# Patient Record
Sex: FEMALE | Race: WHITE | NOT HISPANIC OR LATINO | Employment: FULL TIME | URBAN - NONMETROPOLITAN AREA
[De-identification: names, ages, dates, MRNs, and addresses within clinical notes are randomized per-mention and may not be internally consistent; named-entity substitution may affect disease eponyms.]

---

## 2017-12-27 ENCOUNTER — OFFICE VISIT (OUTPATIENT)
Dept: URGENT CARE | Facility: CLINIC | Age: 42
End: 2017-12-27
Payer: COMMERCIAL

## 2017-12-27 PROCEDURE — 99203 OFFICE O/P NEW LOW 30 MIN: CPT

## 2018-01-01 NOTE — PROGRESS NOTES
Assessment   1  Conjunctivitis, right eye (372 30) (H10 9)    Plan   Conjunctivitis, right eye    · Tobramycin 0 3 % Ophthalmic Solution; INSTILL 1 DROP INTO AFFECTED EYE(S)    4 TIMES DAILY    Discussion/Summary   Discussion Summary: To follow up with PCP in 3- 5 days if no improvement in her symptoms  Medication Side Effects Reviewed: Possible side effects of new medications were reviewed with the patient/guardian today  Understands and agrees with treatment plan: The treatment plan was reviewed with the patient/guardian  The patient/guardian understands and agrees with the treatment plan    Counseling Documentation With Imm: The patient was counseled regarding diagnostic results,-- instructions for management,-- prognosis,-- risks and benefits of treatment options  Follow Up Instructions: Follow Up with your Primary Care Provider in 3-5 days  If your symptoms worsen, go to the nearest [place] [id number] Emergency Department  Chief Complaint   1  Eye Discharge  Chief Complaint Free Text Note Form: Started yesterday with right eye discharge and redness  History of Present Illness   HPI: 37 y/o started yesterday with right eye discharge and redness  Hospital Based Practices Required Assessment:      Pain Assessment      the patient states they do not have pain  (on a scale of 0 to 10, the patient rates the pain at 0 )      Abuse And Domestic Violence Screen       Yes, the patient is safe at home  -- The patient states no one is hurting them  Depression And Suicide Screen  No, the patient has not had thoughts of hurting themself  No, the patient has not felt depressed in the past 7 days  Prefered Language is  [place]  Primary Language is  English  Eye Discharge: Zbigniew Toussaint presents with complaints of eye discharge        Associated symptoms include eye redness, but-- no eye itching,-- no visual blurring,-- no photophobia,-- no facial swelling,-- no facial pain,-- no fever-- and-- no nasal congestion  Review of Systems   Focused-Female:      Constitutional: No fever, no chills, feels well, no tiredness, no recent weight gain or loss  ENT: no ear ache, no loss of hearing, no nosebleeds or nasal discharge, no sore throat or hoarseness  Cardiovascular: no complaints of slow or fast heart rate, no chest pain, no palpitations, no leg claudication or lower extremity edema  Respiratory: no complaints of shortness of breath, no wheezing, no dyspnea on exertion, no orthopnea or PND  Breasts: no complaints of breast pain, breast lump or nipple discharge  Gastrointestinal: no complaints of abdominal pain, no constipation, no nausea or diarrhea, no vomiting, no bloody stools  Genitourinary: no complaints of dysuria, no incontinence, no pelvic pain, no dysmenorrhea, no vaginal discharge or abnormal vaginal bleeding  Musculoskeletal: no complaints of arthralgia, no myalgia, no joint swelling or stiffness, no limb pain or swelling  Integumentary: no complaints of skin rash or lesion, no itching or dry skin, no skin wounds  Neurological: no complaints of headache, no confusion, no numbness or tingling, no dizziness or fainting  Other Symptoms: right pink eye  ROS Reviewed:    ROS reviewed  Past Medical History   Active Problems And Past Medical History Reviewed: The active problems and past medical history were reviewed and updated today  Family History   Family History Reviewed: The family history was reviewed and updated today  Social History    · Never smoker   · No alcohol use   · No illicit drug use  Social History Reviewed: The social history was reviewed and updated today  Surgical History   Surgical History Reviewed: The surgical history was reviewed and updated today  Current Meds    1  Labetalol HCl TABS; Therapy: (Recorded:[id number]) to Recorded   2   MetFORMIN HCl - 1000 MG Oral Tablet; Therapy: (Recorded:[id number]) to Recorded   3  RaNITidine HCl - 150 MG Oral Capsule; Therapy: (Recorded:[id number]) to Recorded  Medication List Reviewed: The medication list was reviewed and updated today  Allergies   1  Biaxin    Vitals   Signs   Recorded: [id number] 09:09AM   Temperature: 97 9 F  Heart Rate: 74  Respiration: 18  Systolic: 678, RUE, Sitting  Diastolic: 68, RUE, Sitting  BP Comments: Dusty  O2 Saturation: 98    Physical Exam        Constitutional      General appearance: No acute distress, well appearing and well nourished  Eyes      Conjunctiva and lids: Abnormal   Conjunctiva Findings: right hyperemia-- and-- purulent discharge on the right, but-- no right subconjunctival hemorrhage,-- no hyperemia on the left,-- no purulent discharge on the left-- and-- no left subconjunctival hemorrhage  Eye Lids: right upper eyelid swelling, but-- no left upper eyelid swelling  Pupils and irises: Equal, round and reactive to light  Ears, Nose, Mouth, and Throat      External inspection of ears and nose: Normal        Otoscopic examination: Tympanic membranes translucent with normal light reflex  Canals patent without erythema  Nasal mucosa, septum, and turbinates: Normal without edema or erythema  Oropharynx: Normal with no erythema, edema, exudate or lesions  Pulmonary      Respiratory effort: No increased work of breathing or signs of respiratory distress  Auscultation of lungs: Clear to auscultation  Cardiovascular      Palpation of heart: Normal PMI, no thrills  Auscultation of heart: Normal rate and rhythm, normal S1 and S2, without murmurs  Lymphatic      Palpation of lymph nodes in neck: No lymphadenopathy  Musculoskeletal      Gait and station: Normal        Digits and nails: Normal without clubbing or cyanosis  Skin      Skin and subcutaneous tissue: Normal without rashes or lesions         Psychiatric      Orientation to person, place, and time: Normal        Mood and affect: Normal        Signatures    Electronically signed by : Zackary Amaya, [facility]; Dec 27 2017  9:30AM EST                       (Author)     Electronically signed by : REMY Baker ; Dec 31 2017 12:43PM EST                       (Co-author)

## 2018-01-23 VITALS
OXYGEN SATURATION: 98 % | RESPIRATION RATE: 18 BRPM | DIASTOLIC BLOOD PRESSURE: 68 MMHG | SYSTOLIC BLOOD PRESSURE: 124 MMHG | TEMPERATURE: 97.9 F | HEART RATE: 74 BPM

## 2018-07-16 ENCOUNTER — OFFICE VISIT (OUTPATIENT)
Dept: URGENT CARE | Facility: CLINIC | Age: 43
End: 2018-07-16
Payer: COMMERCIAL

## 2018-07-16 VITALS
SYSTOLIC BLOOD PRESSURE: 121 MMHG | HEART RATE: 80 BPM | WEIGHT: 207 LBS | OXYGEN SATURATION: 96 % | RESPIRATION RATE: 18 BRPM | BODY MASS INDEX: 34.49 KG/M2 | DIASTOLIC BLOOD PRESSURE: 88 MMHG | TEMPERATURE: 99 F | HEIGHT: 65 IN

## 2018-07-16 DIAGNOSIS — H10.33 ACUTE BACTERIAL CONJUNCTIVITIS OF BOTH EYES: Primary | ICD-10-CM

## 2018-07-16 PROCEDURE — 99213 OFFICE O/P EST LOW 20 MIN: CPT | Performed by: NURSE PRACTITIONER

## 2018-07-16 RX ORDER — BLOOD-GLUCOSE METER
KIT MISCELLANEOUS
COMMUNITY
Start: 2006-03-14

## 2018-07-16 RX ORDER — METFORMIN HYDROCHLORIDE 1000 MG/1
1000 TABLET, FILM COATED, EXTENDED RELEASE ORAL
COMMUNITY
Start: 2016-06-14

## 2018-07-16 RX ORDER — ERGOCALCIFEROL 1.25 MG/1
50000 CAPSULE ORAL
COMMUNITY

## 2018-07-16 RX ORDER — ICOSAPENT ETHYL 1000 MG/1
2 CAPSULE ORAL
COMMUNITY

## 2018-07-16 RX ORDER — CABERGOLINE 0.5 MG/1
TABLET ORAL
COMMUNITY
Start: 2018-03-23

## 2018-07-16 RX ORDER — LABETALOL 200 MG/1
TABLET, FILM COATED ORAL
COMMUNITY
Start: 2018-06-13

## 2018-07-16 RX ORDER — ACETAMINOPHEN, ASPIRIN AND CAFFEINE 250; 250; 65 MG/1; MG/1; MG/1
2 TABLET, FILM COATED ORAL
COMMUNITY

## 2018-07-16 RX ORDER — IBUPROFEN 800 MG/1
TABLET ORAL
COMMUNITY
Start: 2014-04-04

## 2018-07-16 RX ORDER — TOBRAMYCIN AND DEXAMETHASONE 3; 1 MG/ML; MG/ML
1 SUSPENSION/ DROPS OPHTHALMIC
Qty: 5 ML | Refills: 0 | Status: SHIPPED | OUTPATIENT
Start: 2018-07-16 | End: 2018-07-23

## 2018-07-16 RX ORDER — BROMOCRIPTINE MESYLATE 2.5 MG/1
2.5 TABLET ORAL
COMMUNITY

## 2018-07-16 RX ORDER — SUMATRIPTAN 100 MG/1
TABLET, FILM COATED ORAL
COMMUNITY
Start: 2011-08-23

## 2018-07-16 RX ORDER — LANCETS
EACH MISCELLANEOUS
COMMUNITY
Start: 2013-04-03

## 2018-07-16 NOTE — PROGRESS NOTES
[facility]        NAME: Ricardo Cesar is a 39 y o  female  : 1975    MRN: [id number]  DATE: 2018  TIME: 11:35 AM    Assessment and Plan   Acute bacterial conjunctivitis of both eyes [H10 33]  1  Acute bacterial conjunctivitis of both eyes  tobramycin-dexamethasone (TOBRADEX) ophthalmic suspension         Patient Instructions       Follow up with PCP in 3-5 days  Proceed to  ER if symptoms worsen  Chief Complaint     Chief Complaint   Patient presents with    Eye Pain     L eye redness with itching and drainage noted   Ezekiel Carmona LPN         History of Present Illness       21-year-old female presents to urgent care with chief complaint of left eye redness itching drainage x1 day, patient also states her right eye is starting to have some redness no drainage noted from that eye  She has not used any over-the-counter medications for the symptoms reports symptoms gradually worsening  Eye Pain    Both eyes are affected  This is a new problem  The current episode started today  The problem occurs constantly  The problem has been gradually worsening  There was no injury mechanism  The patient is experiencing no pain  There is no known exposure to pink eye  She wears contacts  Associated symptoms include an eye discharge and itching  Pertinent negatives include no blurred vision, double vision, eye redness, fever, foreign body sensation, nausea, photophobia, recent URI or vomiting  She has tried nothing for the symptoms  The treatment provided no relief  Review of Systems   Review of Systems   Constitutional: Negative  Negative for fever  HENT: Negative  Eyes: Positive for pain, discharge and itching  Negative for blurred vision, double vision, photophobia, redness and visual disturbance  Respiratory: Negative  Cardiovascular: Negative  Gastrointestinal: Negative  Negative for nausea and vomiting  Endocrine: Negative  Genitourinary: Negative      Musculoskeletal: Negative  Skin: Negative  Allergic/Immunologic: Negative  Neurological: Negative  Hematological: Negative  Psychiatric/Behavioral: Negative  All other systems reviewed and are negative          Current Medications       Current Outpatient Prescriptions:     aspirin-acetaminophen-caffeine (EXCEDRIN MIGRAINE) 250-250-65 MG per tablet, Take 2 tablets by mouth, Disp: , Rfl:     Blood Glucose Monitoring Suppl (ACURA BLOOD GLUCOSE METER) w/Device KIT, by Does not apply route, Disp: , Rfl:     bromocriptine (PARLODEL) 2 5 mg tablet, Take 2 5 mg by mouth, Disp: , Rfl:     cabergoline (DOSTINEX) 0 5 MG tablet, TAKE ONE-HALF (1/2) TABLET AT BEDTIME ONCE EVERY 10 DAYS, Disp: , Rfl:     ergocalciferol (VITAMIN D2) 50,000 units, Take 50,000 Units by mouth, Disp: , Rfl:     glucose blood (ONE TOUCH ULTRA TEST) test strip, check bs daily, Disp: , Rfl:     glucose monitoring kit (FREESTYLE) monitoring kit, by Does not apply route, Disp: , Rfl:     ibuprofen (MOTRIN) 800 mg tablet, Take by mouth, Disp: , Rfl:     Icosapent Ethyl (VASCEPA) 1 g CAPS, Take 2 g by mouth, Disp: , Rfl:     insulin regular (HumuLIN R,NovoLIN R) 100 units/mL injection, Inject 14 units under the skin as directed per specialty, Disp: , Rfl:     labetalol (NORMODYNE) 200 mg tablet, TAKE 1 TABLET TWICE A DAY, Disp: , Rfl:     Lancets Thin MISC, by Does not apply route, Disp: , Rfl:     metFORMIN (GLUMETZA) 1000 MG (MOD) 24 hr tablet, Take 1,000 mg by mouth, Disp: , Rfl:     SUMAtriptan (IMITREX) 100 mg tablet, Take by mouth, Disp: , Rfl:     tobramycin-dexamethasone (TOBRADEX) ophthalmic suspension, Administer 1 drop to both eyes every 4 (four) hours while awake for 7 days, Disp: 5 mL, Rfl: 0    Current Allergies     Allergies as of 07/16/2018 - Reviewed 07/16/2018   Allergen Reaction Noted    Clarithromycin  09/12/2016            The following portions of the patient's history were reviewed and updated as appropriate: allergies, current medications, past family history, past medical history, past social history, past surgical history and problem list      Past Medical History:   Diagnosis Date    Diabetes mellitus ([address] )        Past Surgical History:   Procedure Laterality Date    UTERINE FIBROID SURGERY         Family History   Problem Relation Age of Onset    Cancer Mother     Heart disease Father          Medications have been verified  Objective   /88 (BP Location: Right arm, Patient Position: Sitting, Cuff Size: Standard)   Pulse 80   Temp 99 °F (37 2 °C) (Tympanic)   Resp 18   Ht 5' 5" (1 651 m)   Wt 93 9 kg (207 lb)   LMP 07/04/2018 (Exact Date)   SpO2 96%   BMI 34 45 kg/m²        Physical Exam     Physical Exam   Constitutional: She is oriented to person, place, and time  Vital signs are normal  She appears well-developed  HENT:   Head: Normocephalic and atraumatic  Right Ear: External ear normal    Left Ear: External ear normal    Nose: Nose normal    Mouth/Throat: Oropharynx is clear and moist    Eyes: EOM are normal  Pupils are equal, round, and reactive to light  Lids are everted and swept, no foreign bodies found  Right eye exhibits no chemosis, no discharge, no exudate and no hordeolum  No foreign body present in the right eye  Left eye exhibits exudate  Left eye exhibits no chemosis, no discharge and no hordeolum  No foreign body present in the left eye  Right conjunctiva is injected  Right conjunctiva has no hemorrhage  Left conjunctiva is injected  Left conjunctiva has no hemorrhage  No scleral icterus  Neck: Normal range of motion  Neck supple  Cardiovascular: Normal rate, regular rhythm, normal heart sounds and intact distal pulses  Pulmonary/Chest: Effort normal and breath sounds normal    Abdominal: Soft  Normal appearance and bowel sounds are normal    Musculoskeletal: Normal range of motion  Neurological: She is alert and oriented to person, place, and time   She has normal reflexes  Skin: Skin is warm, dry and intact  Psychiatric: She has a normal mood and affect  Her speech is normal and behavior is normal  Judgment and thought content normal    Nursing note and vitals reviewed

## 2018-07-16 NOTE — PATIENT INSTRUCTIONS
Conjunctivitis   AMBULATORY CARE:   Conjunctivitis,  or pink eye, is inflammation of your conjunctiva  The conjunctiva is a thin tissue that covers the front of your eye and the back of your eyelids  The conjunctiva helps protect your eye and keep it moist  Conjunctivitis may be caused by bacteria, allergies, or a virus  If your conjunctivitis is caused by bacteria, it may get better on its own in about 7 days  Viral conjunctivitis can last up to 3 weeks  Common symptoms may include any of the following: You will usually have symptoms in both eyes if your conjunctivitis is caused by allergies  You may also have other allergic symptoms, such as a rash or runny nose  Symptoms will usually start in 1 eye if your conjunctivitis is caused by a virus or bacteria  · Redness in the whites of your eye    · Itching in your eye or around your eye    · Feeling like there is something in your eye    · Watery or thick, sticky discharge    · Crusty eyelids when you wake up in the morning    · Burning, stinging, or swelling in your eye    · Pain when you see bright light  Seek care immediately if:   · You have worsening eye pain  · The swelling in your eye gets worse, even after treatment  · Your vision suddenly becomes worse or you cannot see at all  Contact your healthcare provider if:   · You develop a fever and ear pain  · You have tiny bumps or spots of blood on your eye  · You have questions or concerns about your condition or care  Treatment  will depend on the cause of your conjunctivitis  You may need antibiotics or allergy medicine as a pill, eye drop, or eye ointment  Manage your symptoms:   · Apply a cool compress  Wet a washcloth with cold water and place it on your eye  This will help decrease itching and irritation  · Do not wear contact lenses  They can irritate your eye  Throw away the pair you are using and ask when you can wear them again   Use a new pair of lenses when your healthcare provider says it is okay  · Avoid irritants  Stay away from smoke filled areas  Shield your eyes from wind and sun  · Flush your eye  You may need to flush your eye with saline to help decrease your symptoms  Ask for more information on how to flush your eye  Medicines:  Treatment depends on what is causing your conjunctivitis  You may be given any of the following:  · Allergy medicine  helps decrease itchy, red, swollen eyes caused by allergies  It may be given as a pill, eye drops, or nasal spray  · Antibiotics  may be needed if your conjunctivitis is caused by bacteria  This medicine may be given as a pill, eye drops, or eye ointment  · Take your medicine as directed  Contact your healthcare provider if you think your medicine is not helping or if you have side effects  Tell him or her if you are allergic to any medicine  Keep a list of the medicines, vitamins, and herbs you take  Include the amounts, and when and why you take them  Bring the list or the pill bottles to follow-up visits  Carry your medicine list with you in case of an emergency  Prevent the spread of conjunctivitis:   · Wash your hands with soap and water often  Wash your hands before and after you touch your eyes  Also wash your hands before you prepare or eat food and after you use the bathroom or change a diaper  · Avoid allergens  Try to avoid the things that cause your allergies, such as pets, dust, or grass  · Avoid contact with others  Do not share towels or washcloths  Try to stay away from others as much as possible  Ask when you can return to work or school  · Throw away eye makeup  The bacteria that caused your conjunctivitis can stay in eye makeup  Throw away mascara and other eye makeup  © 2017 [address]  Information is for End User's use only and may not be sold, redistributed or otherwise used for commercial purposes   All illustrations and images included in [address] are the copyrighted property of [employer] M [employer]  or Plac[employer] Velásquez  The above information is an  only  It is not intended as medical advice for individual conditions or treatments  Talk to your doctor, nurse or pharmacist before following any medical regimen to see if it is safe and effective for you

## 2022-01-13 ENCOUNTER — TELEPHONE (OUTPATIENT)
Dept: FAMILY MEDICINE CLINIC | Facility: CLINIC | Age: 47
End: 2022-01-13

## 2022-05-08 ENCOUNTER — OFFICE VISIT (OUTPATIENT)
Dept: URGENT CARE | Facility: MEDICAL CENTER | Age: 47
End: 2022-05-08
Payer: COMMERCIAL

## 2022-05-08 VITALS
RESPIRATION RATE: 18 BRPM | WEIGHT: 191 LBS | HEIGHT: 65 IN | HEART RATE: 83 BPM | DIASTOLIC BLOOD PRESSURE: 65 MMHG | OXYGEN SATURATION: 98 % | SYSTOLIC BLOOD PRESSURE: 140 MMHG | BODY MASS INDEX: 31.82 KG/M2 | TEMPERATURE: 97.6 F

## 2022-05-08 DIAGNOSIS — H66.92 LEFT OTITIS MEDIA, UNSPECIFIED OTITIS MEDIA TYPE: Primary | ICD-10-CM

## 2022-05-08 DIAGNOSIS — J06.9 ACUTE URI: ICD-10-CM

## 2022-05-08 PROCEDURE — 99212 OFFICE O/P EST SF 10 MIN: CPT | Performed by: PHYSICIAN ASSISTANT

## 2022-05-08 RX ORDER — AMOXICILLIN 875 MG/1
875 TABLET, COATED ORAL 2 TIMES DAILY
Qty: 20 TABLET | Refills: 0 | Status: SHIPPED | OUTPATIENT
Start: 2022-05-08 | End: 2022-05-18

## 2022-05-08 NOTE — PATIENT INSTRUCTIONS
Ear Infection   WHAT YOU NEED TO KNOW:   What do I need to know about an ear infection? An ear infection is also called otitis media  Blocked or swollen eustachian tubes can cause an infection  Eustachian tubes connect the middle ear to the back of the nose and throat  They drain fluid from the middle ear  You may have a buildup of fluid in your ear  Germs build up in the fluid and infection develops  What are the signs and symptoms of an ear infection? · Ear pain    · Fever or a headache    · Trouble hearing    · Ringing or buzzing in your ear    · Plugged ear or an ear that feels full    · Dizziness    · Nausea or vomiting    How is an ear infection diagnosed? Your healthcare provider will examine your ears, head, neck, and mouth  He or she will also ask you to describe your symptoms  You may also need the following:  · Audiometry  is a test used to check for hearing loss  Sounds are played at different volumes to check how much you can hear  · Tympanometry  is a test used to check pressure changes in your inner ear  How is an ear infection treated? · Acetaminophen  decreases pain and fever  It is available without a doctor's order  Ask how much to take and how often to take it  Follow directions  Read the labels of all other medicines you are using to see if they also contain acetaminophen, or ask your doctor or pharmacist  Acetaminophen can cause liver damage if not taken correctly  Do not use more than 4 grams (4,000 milligrams) total of acetaminophen in one day  · NSAIDs , such as ibuprofen, help decrease swelling, pain, and fever  This medicine is available with or without a doctor's order  NSAIDs can cause stomach bleeding or kidney problems in certain people  If you take blood thinner medicine, always ask your healthcare provider if NSAIDs are safe for you  Always read the medicine label and follow directions      · Ear drops  may contain medicine to decrease pain and inflammation  · Antibiotics  help treat a bacterial infection  How can I manage my symptoms? · Apply heat  on your ear for 15 to 20 minutes, 3 to 4 times a day or as directed  You can apply heat with an electric heating pad, hot water bottle, or warm compress  Always put a cloth between your skin and the heat pack to prevent burns  Heat helps decrease pain  · Apply ice  on your ear for 15 to 20 minutes, 3 to 4 times a day for 2 days or as directed  Use an ice pack, or put crushed ice in a plastic bag  Cover it with a towel before you apply it to your ear  Ice decreases swelling and pain  How can I help prevent an ear infection? · Wash your hands often  to help prevent the spread of germs  Ask everyone in your house to wash their hands with soap and water  Ask them to wash after they use the bathroom or change a diaper  Remind them to wash before they prepare or eat food  · Stay away from people who are ill  Some germs spread easily and quickly through contact  When should I seek immediate care? · You have clear fluid coming from your ear  · You have a stiff neck, headache, and a fever  When should I call my doctor? · You see blood or pus draining from your ear  · Your ear pain gets worse or does not go away, even after treatment  · The outside of your ear is red or swollen  · You are vomiting or have diarrhea  · You have questions or concerns about your condition or care  CARE AGREEMENT:   You have the right to help plan your care  Learn about your health condition and how it may be treated  Discuss treatment options with your healthcare providers to decide what care you want to receive  You always have the right to refuse treatment  The above information is an  only  It is not intended as medical advice for individual conditions or treatments   Talk to your doctor, nurse or pharmacist before following any medical regimen to see if it is safe and effective for you  © Copyright [employer] 2022 Information is for End User's use only and may not be sold, redistributed or otherwise used for commercial purposes   All illustrations and images included in CareNotes® are the copyrighted property of A D A M , Inc  or Ana Maria Plata

## 2022-05-08 NOTE — PROGRESS NOTES
[facility]        NAME: Mervyn Aschoff is a 51 y o  female  : 1975    MRN: [id number]  DATE: May 8, 2022  TIME: 1:21 PM    Assessment and Plan   Left otitis media, unspecified otitis media type [H66 92]  1  Left otitis media, unspecified otitis media type  amoxicillin (AMOXIL) 875 mg tablet   2  Acute URI           Patient Instructions       Follow up with PCP in 3-5 days  Proceed to  ER if symptoms worsen  Chief Complaint     Chief Complaint   Patient presents with    Earache     left ear pain that started today     Cold Like Symptoms     sinus congestion and pressure          History of Present Illness       Patient presents with a 3 day history of cold-like symptoms  Today she developed left ear pain  No known exposure to COVID or flu  She is vaccinated for COVID  Review of Systems   Review of Systems   Constitutional: Negative for chills and fever  HENT: Positive for congestion, ear pain, rhinorrhea and sore throat  Respiratory: Positive for cough  Negative for shortness of breath  Gastrointestinal: Negative for diarrhea, nausea and vomiting  Musculoskeletal: Positive for myalgias  Skin: Negative for rash  Neurological: Positive for headaches  Negative for dizziness           Current Medications       Current Outpatient Medications:     aspirin-acetaminophen-caffeine (EXCEDRIN MIGRAINE) 250-250-65 MG per tablet, Take 2 tablets by mouth, Disp: , Rfl:     Blood Glucose Monitoring Suppl (ACURA BLOOD GLUCOSE METER) w/Device KIT, by Does not apply route, Disp: , Rfl:     bromocriptine (PARLODEL) 2 5 mg tablet, Take 2 5 mg by mouth, Disp: , Rfl:     cabergoline (DOSTINEX) 0 5 MG tablet, TAKE ONE-HALF (1/2) TABLET AT BEDTIME ONCE EVERY 10 DAYS, Disp: , Rfl:     ergocalciferol (VITAMIN D2) 50,000 units, Take 50,000 Units by mouth, Disp: , Rfl:     glucose blood (ONE TOUCH ULTRA TEST) test strip, check bs daily, Disp: , Rfl:     glucose monitoring kit (FREESTYLE) monitoring kit, by Does not apply route, Disp: , Rfl:     ibuprofen (MOTRIN) 800 mg tablet, Take by mouth, Disp: , Rfl:     Icosapent Ethyl (VASCEPA) 1 g CAPS, Take 2 g by mouth, Disp: , Rfl:     insulin regular (HumuLIN R,NovoLIN R) 100 units/mL injection, Inject 14 units under the skin as directed per specialty, Disp: , Rfl:     labetalol (NORMODYNE) 200 mg tablet, TAKE 1 TABLET TWICE A DAY, Disp: , Rfl:     Lancets Thin MISC, by Does not apply route, Disp: , Rfl:     metFORMIN (GLUMETZA) 1000 MG (MOD) 24 hr tablet, Take 1,000 mg by mouth, Disp: , Rfl:     SUMAtriptan (IMITREX) 100 mg tablet, Take by mouth, Disp: , Rfl:     amoxicillin (AMOXIL) 875 mg tablet, Take 1 tablet (875 mg total) by mouth 2 (two) times a day for 10 days, Disp: 20 tablet, Rfl: 0    tobramycin-dexamethasone (TOBRADEX) ophthalmic suspension, Administer 1 drop to both eyes every 4 (four) hours while awake for 7 days, Disp: 5 mL, Rfl: 0    Current Allergies     Allergies as of 05/08/2022 - Reviewed 05/08/2022   Allergen Reaction Noted    Clarithromycin Nausea Only 09/12/2016            The following portions of the patient's history were reviewed and updated as appropriate: allergies, current medications, past family history, past medical history, past social history, past surgical history and problem list      Past Medical History:   Diagnosis Date    Diabetes mellitus ([facility] [address] )        Past Surgical History:   Procedure Laterality Date    UTERINE FIBROID SURGERY         Family History   Problem Relation Age of Onset    Cancer Mother     Heart disease Father          Medications have been verified  Objective   /65   Pulse 83   Temp 97 6 °F (36 4 °C)   Resp 18   Ht 5' 5" (1 651 m)   Wt 86 6 kg (191 lb)   SpO2 98%   BMI 31 78 kg/m²   No LMP recorded  Physical Exam     Physical Exam  Vitals and nursing note reviewed  Constitutional:       Appearance: Normal appearance  HENT:      Head: Normocephalic and atraumatic  Right Ear: Tympanic membrane normal       Ears:      Comments: Left TM with erythema and bulging  Nose: Nose normal       Mouth/Throat:      Mouth: Mucous membranes are moist       Pharynx: Posterior oropharyngeal erythema present  Eyes:      Conjunctiva/sclera: Conjunctivae normal    Cardiovascular:      Rate and Rhythm: Normal rate and regular rhythm  Heart sounds: Normal heart sounds  Pulmonary:      Effort: Pulmonary effort is normal       Breath sounds: Normal breath sounds  Musculoskeletal:      Cervical back: Neck supple  Lymphadenopathy:      Cervical: No cervical adenopathy  Skin:     General: Skin is warm  Neurological:      Mental Status: She is alert

## 2022-10-28 ENCOUNTER — OFFICE VISIT (OUTPATIENT)
Dept: URGENT CARE | Facility: MEDICAL CENTER | Age: 47
End: 2022-10-28
Payer: COMMERCIAL

## 2022-10-28 VITALS
HEART RATE: 92 BPM | RESPIRATION RATE: 18 BRPM | SYSTOLIC BLOOD PRESSURE: 130 MMHG | BODY MASS INDEX: 32.49 KG/M2 | DIASTOLIC BLOOD PRESSURE: 86 MMHG | HEIGHT: 65 IN | WEIGHT: 195 LBS | TEMPERATURE: 97.2 F | OXYGEN SATURATION: 100 %

## 2022-10-28 DIAGNOSIS — H66.92 LEFT OTITIS MEDIA, UNSPECIFIED OTITIS MEDIA TYPE: Primary | ICD-10-CM

## 2022-10-28 PROCEDURE — 99213 OFFICE O/P EST LOW 20 MIN: CPT | Performed by: PHYSICIAN ASSISTANT

## 2022-10-28 RX ORDER — AMOXICILLIN 875 MG/1
875 TABLET, COATED ORAL 2 TIMES DAILY
Qty: 14 TABLET | Refills: 0 | Status: SHIPPED | OUTPATIENT
Start: 2022-10-28 | End: 2022-11-04

## 2022-10-28 NOTE — PROGRESS NOTES
[facility]        NAME: Deanne Raman is a 49 y o  female  : 1975    MRN: [id number]  DATE: 2022  TIME: 10:10 AM    Assessment and Plan   Left otitis media, unspecified otitis media type [H66 92]  1  Left otitis media, unspecified otitis media type  amoxicillin (AMOXIL) 875 mg tablet         Patient Instructions       Follow up with PCP in 3-5 days  Proceed to  ER if symptoms worsen  Chief Complaint     Chief Complaint   Patient presents with   • Earache     B/l ear pain that started x 2 days ago , body aches          History of Present Illness       Patient is a 50 yo female who presents with a cc of bilateral ear pain x 2 days  Denies fevers, chills, hearing loss, drainage from the ear, pain or swelling behind the ear, headaches, dizziness  Review of Systems   Review of Systems   Constitutional: Negative for chills and fever  HENT: Positive for ear pain  Negative for ear discharge, facial swelling and hearing loss  Skin: Negative for color change  Neurological: Negative for dizziness and headaches           Current Medications       Current Outpatient Medications:   •  amoxicillin (AMOXIL) 875 mg tablet, Take 1 tablet (875 mg total) by mouth 2 (two) times a day for 7 days, Disp: 14 tablet, Rfl: 0  •  aspirin-acetaminophen-caffeine (EXCEDRIN MIGRAINE) 250-250-65 MG per tablet, Take 2 tablets by mouth, Disp: , Rfl:   •  Blood Glucose Monitoring Suppl (ACURA BLOOD GLUCOSE METER) w/Device KIT, by Does not apply route, Disp: , Rfl:   •  bromocriptine (PARLODEL) 2 5 mg tablet, Take 2 5 mg by mouth, Disp: , Rfl:   •  cabergoline (DOSTINEX) 0 5 MG tablet, TAKE ONE-HALF (1/2) TABLET AT BEDTIME ONCE EVERY 10 DAYS, Disp: , Rfl:   •  ergocalciferol (VITAMIN D2) 50,000 units, Take 50,000 Units by mouth, Disp: , Rfl:   •  glucose blood test strip, check bs daily, Disp: , Rfl:   •  glucose monitoring kit (FREESTYLE) monitoring kit, by Does not apply route, Disp: , Rfl:   •  ibuprofen (MOTRIN) 800 mg tablet, Take by mouth, Disp: , Rfl:   •  Icosapent Ethyl 1 g CAPS, Take 2 g by mouth, Disp: , Rfl:   •  insulin regular (HumuLIN R,NovoLIN R) 100 units/mL injection, Inject 14 units under the skin as directed per specialty, Disp: , Rfl:   •  labetalol (NORMODYNE) 200 mg tablet, TAKE 1 TABLET TWICE A DAY, Disp: , Rfl:   •  Lancets Thin MISC, by Does not apply route, Disp: , Rfl:   •  metFORMIN (GLUMETZA) 1000 MG (MOD) 24 hr tablet, Take 1,000 mg by mouth, Disp: , Rfl:   •  SUMAtriptan (IMITREX) 100 mg tablet, Take by mouth, Disp: , Rfl:   •  tobramycin-dexamethasone (TOBRADEX) ophthalmic suspension, Administer 1 drop to both eyes every 4 (four) hours while awake for 7 days, Disp: 5 mL, Rfl: 0    Current Allergies     Allergies as of 10/28/2022 - Reviewed 10/28/2022   Allergen Reaction Noted   • Clarithromycin Nausea Only 09/12/2016            The following portions of the patient's history were reviewed and updated as appropriate: allergies, current medications, past family history, past medical history, past social history, past surgical history and problem list      Past Medical History:   Diagnosis Date   • Diabetes mellitus ([address] )        Past Surgical History:   Procedure Laterality Date   • UTERINE FIBROID SURGERY         Family History   Problem Relation Age of Onset   • Cancer Mother    • Heart disease Father          Medications have been verified  Objective   /86   Pulse 92   Temp (!) 97 2 °F (36 2 °C)   Resp 18   Ht 5' 5" (1 651 m)   Wt 88 5 kg (195 lb)   SpO2 100%   BMI 32 45 kg/m²        Physical Exam     Physical Exam  Constitutional:       General: She is not in acute distress  Appearance: Normal appearance  She is not ill-appearing or diaphoretic  HENT:      Right Ear: Ear canal and external ear normal  Tympanic membrane is not erythematous or bulging  Left Ear: Ear canal and external ear normal  Tympanic membrane is erythematous and bulging        Nose: Nose normal       Mouth/Throat:      Mouth: Mucous membranes are moist       Pharynx: Oropharynx is clear  Eyes:      Conjunctiva/sclera: Conjunctivae normal    Cardiovascular:      Rate and Rhythm: Normal rate and regular rhythm  Heart sounds: Normal heart sounds  Pulmonary:      Effort: Pulmonary effort is normal       Breath sounds: Normal breath sounds  Skin:     General: Skin is warm and dry  Neurological:      Mental Status: She is alert     Psychiatric:         Mood and Affect: Mood normal          Behavior: Behavior normal

## 2022-11-06 ENCOUNTER — OFFICE VISIT (OUTPATIENT)
Dept: URGENT CARE | Facility: MEDICAL CENTER | Age: 47
End: 2022-11-06

## 2022-11-06 VITALS
HEART RATE: 72 BPM | RESPIRATION RATE: 20 BRPM | OXYGEN SATURATION: 99 % | SYSTOLIC BLOOD PRESSURE: 132 MMHG | DIASTOLIC BLOOD PRESSURE: 86 MMHG | TEMPERATURE: 97.8 F

## 2022-11-06 DIAGNOSIS — H65.02 NON-RECURRENT ACUTE SEROUS OTITIS MEDIA OF LEFT EAR: Primary | ICD-10-CM

## 2022-11-06 RX ORDER — METHYLPREDNISOLONE 4 MG/1
TABLET ORAL
Qty: 1 EACH | Refills: 0 | Status: SHIPPED | OUTPATIENT
Start: 2022-11-06

## 2022-11-06 RX ORDER — AZITHROMYCIN 250 MG/1
TABLET, FILM COATED ORAL
Qty: 6 TABLET | Refills: 0 | Status: SHIPPED | OUTPATIENT
Start: 2022-11-06 | End: 2022-11-10

## 2022-11-06 NOTE — PROGRESS NOTES
[facility]        NAME: Bairon Salazar is a 49 y o  female  : 1975    MRN: [id number]  DATE: 2022  TIME: 2:30 PM    Assessment and Plan   Non-recurrent acute serous otitis media of left ear [H65 02]  1  Non-recurrent acute serous otitis media of left ear  azithromycin (ZITHROMAX) 250 mg tablet    methylPREDNISolone 4 MG tablet therapy pack         Patient Instructions     If symptoms fail to improve follow-up with ENT  Follow up with PCP in 3-5 days  Proceed to  ER if symptoms worsen  Chief Complaint     Chief Complaint   Patient presents with   • Earache     Left ear pain  Just finished Amoxil  History of Present Illness       Patient just finished amoxicillin for left otitis media  She has persistent left ear pain  No cold symptoms fever chills  No muffled hearing  Review of Systems   Review of Systems   Constitutional: Negative for chills and fever  HENT: Positive for ear pain  Negative for congestion, ear discharge, hearing loss, rhinorrhea and sore throat  Respiratory: Negative for cough            Current Medications       Current Outpatient Medications:   •  aspirin-acetaminophen-caffeine (EXCEDRIN MIGRAINE) 250-250-65 MG per tablet, Take 2 tablets by mouth, Disp: , Rfl:   •  azithromycin (ZITHROMAX) 250 mg tablet, Take 2 tablets today then 1 tablet daily x 4 days, Disp: 6 tablet, Rfl: 0  •  Blood Glucose Monitoring Suppl (ACURA BLOOD GLUCOSE METER) w/Device KIT, by Does not apply route, Disp: , Rfl:   •  bromocriptine (PARLODEL) 2 5 mg tablet, Take 2 5 mg by mouth, Disp: , Rfl:   •  cabergoline (DOSTINEX) 0 5 MG tablet, TAKE ONE-HALF (1/2) TABLET AT BEDTIME ONCE EVERY 10 DAYS, Disp: , Rfl:   •  ergocalciferol (VITAMIN D2) 50,000 units, Take 50,000 Units by mouth, Disp: , Rfl:   •  glucose blood test strip, check bs daily, Disp: , Rfl:   •  glucose monitoring kit (FREESTYLE) monitoring kit, by Does not apply route, Disp: , Rfl:   •  ibuprofen (MOTRIN) 800 mg tablet, Take by mouth, Disp: , Rfl:   •  Icosapent Ethyl 1 g CAPS, Take 2 g by mouth, Disp: , Rfl:   •  insulin regular (HumuLIN R,NovoLIN R) 100 units/mL injection, Inject 14 units under the skin as directed per specialty, Disp: , Rfl:   •  labetalol (NORMODYNE) 200 mg tablet, TAKE 1 TABLET TWICE A DAY, Disp: , Rfl:   •  Lancets Thin MISC, by Does not apply route, Disp: , Rfl:   •  metFORMIN (GLUMETZA) 1000 MG (MOD) 24 hr tablet, Take 1,000 mg by mouth, Disp: , Rfl:   •  methylPREDNISolone 4 MG tablet therapy pack, Use as directed on package, Disp: 1 each, Rfl: 0  •  SUMAtriptan (IMITREX) 100 mg tablet, Take by mouth, Disp: , Rfl:   •  tobramycin-dexamethasone (TOBRADEX) ophthalmic suspension, Administer 1 drop to both eyes every 4 (four) hours while awake for 7 days, Disp: 5 mL, Rfl: 0    Current Allergies     Allergies as of 11/06/2022 - Reviewed 10/28/2022   Allergen Reaction Noted   • Clarithromycin Nausea Only 09/12/2016            The following portions of the patient's history were reviewed and updated as appropriate: allergies, current medications, past family history, past medical history, past social history, past surgical history and problem list      Past Medical History:   Diagnosis Date   • Diabetes mellitus ([address] )        Past Surgical History:   Procedure Laterality Date   • UTERINE FIBROID SURGERY         Family History   Problem Relation Age of Onset   • Cancer Mother    • Heart disease Father          Medications have been verified  Objective   /86   Pulse 72   Temp 97 8 °F (36 6 °C)   Resp 20   SpO2 99%   No LMP recorded  Physical Exam     Physical Exam  Vitals and nursing note reviewed  Constitutional:       Appearance: Normal appearance  HENT:      Head: Normocephalic and atraumatic  Right Ear: Tympanic membrane and ear canal normal       Left Ear: Ear canal normal       Ears:      Comments: Left TM without erythema diminished light reflex and fluid  Mouth/Throat:      Mouth: Mucous membranes are moist    Cardiovascular:      Rate and Rhythm: Normal rate and regular rhythm  Pulmonary:      Effort: Pulmonary effort is normal    Skin:     General: Skin is warm  Neurological:      Mental Status: She is alert

## 2022-12-20 ENCOUNTER — OFFICE VISIT (OUTPATIENT)
Dept: URGENT CARE | Facility: MEDICAL CENTER | Age: 47
End: 2022-12-20

## 2022-12-20 VITALS
BODY MASS INDEX: 32.45 KG/M2 | RESPIRATION RATE: 18 BRPM | WEIGHT: 195 LBS | OXYGEN SATURATION: 98 % | TEMPERATURE: 97.3 F | HEART RATE: 86 BPM

## 2022-12-20 DIAGNOSIS — J06.9 ACUTE URI: Primary | ICD-10-CM

## 2022-12-20 LAB
SARS-COV-2 AG UPPER RESP QL IA: NEGATIVE
VALID CONTROL: NORMAL

## 2022-12-20 RX ORDER — FAMOTIDINE 20 MG/1
TABLET, FILM COATED ORAL
COMMUNITY
Start: 2022-12-13

## 2022-12-20 RX ORDER — EMPAGLIFLOZIN, METFORMIN HYDROCHLORIDE 12.5; 1 MG/1; MG/1
TABLET, EXTENDED RELEASE ORAL
COMMUNITY
Start: 2022-12-01

## 2022-12-20 RX ORDER — BENZONATATE 200 MG/1
200 CAPSULE ORAL 3 TIMES DAILY PRN
Qty: 20 CAPSULE | Refills: 0 | Status: SHIPPED | OUTPATIENT
Start: 2022-12-20

## 2022-12-20 RX ORDER — FLUTICASONE PROPIONATE 50 MCG
2 SPRAY, SUSPENSION (ML) NASAL DAILY
Qty: 1 G | Refills: 0 | Status: SHIPPED | OUTPATIENT
Start: 2022-12-20

## 2022-12-20 NOTE — PROGRESS NOTES
[facility]        NAME: Delores Elmore is a 49 y o  female  : 1975    MRN: [id number]  DATE: 2022  TIME: 4:23 PM    Assessment and Plan   Acute cough [R05 1]  1  Acute cough  Poct Covid 19 Rapid Antigen Test            Patient Instructions       Follow up with PCP in 3-5 days  Proceed to  ER if symptoms worsen  Chief Complaint     Chief Complaint   Patient presents with   • Cough     X 4 days   • Cold Like Symptoms         History of Present Illness       Patient is a 50 y/o/f presenting [facility] with cough/congestion, loss of voice, sinus pressure and post nasal drainage  Symptoms began about 4 days ago  Pt has been taking Nyquil  Pt denies any fever, body aches CP or SOB  Cough  This is a new problem  The current episode started in the past 7 days  The problem has been waxing and waning  The problem occurs every few minutes  Associated symptoms include headaches, nasal congestion, postnasal drip and rhinorrhea  Pertinent negatives include no chest pain, fever, myalgias or shortness of breath  Review of Systems   Review of Systems   Constitutional: Negative for fever  HENT: Positive for congestion, postnasal drip and rhinorrhea  Respiratory: Positive for cough  Negative for shortness of breath  Cardiovascular: Negative for chest pain  Musculoskeletal: Negative for myalgias  Neurological: Positive for headaches           Current Medications       Current Outpatient Medications:   •  aspirin-acetaminophen-caffeine (EXCEDRIN MIGRAINE) 250-250-65 MG per tablet, Take 2 tablets by mouth, Disp: , Rfl:   •  Blood Glucose Monitoring Suppl (ACURA BLOOD GLUCOSE METER) w/Device KIT, by Does not apply route, Disp: , Rfl:   •  bromocriptine (PARLODEL) 2 5 mg tablet, Take 2 5 mg by mouth, Disp: , Rfl:   •  cabergoline (DOSTINEX) 0 5 MG tablet, TAKE ONE-HALF (1/2) TABLET AT BEDTIME ONCE EVERY 10 DAYS, Disp: , Rfl:   •  ergocalciferol (VITAMIN D2) 50,000 units, Take 50,000 Units by mouth, Disp: , Rfl:   •  famotidine (PEPCID) 20 mg tablet, , Disp: , Rfl:   •  glucose blood test strip, check bs daily, Disp: , Rfl:   •  glucose monitoring kit (FREESTYLE) monitoring kit, by Does not apply route, Disp: , Rfl:   •  ibuprofen (MOTRIN) 800 mg tablet, Take by mouth, Disp: , Rfl:   •  Icosapent Ethyl 1 g CAPS, Take 2 g by mouth, Disp: , Rfl:   •  insulin regular (HumuLIN R,NovoLIN R) 100 units/mL injection, Inject 14 units under the skin as directed per specialty, Disp: , Rfl:   •  labetalol (NORMODYNE) 200 mg tablet, TAKE 1 TABLET TWICE A DAY, Disp: , Rfl:   •  Lancets Thin MISC, by Does not apply route, Disp: , Rfl:   •  metFORMIN (GLUMETZA) 1000 MG (MOD) 24 hr tablet, Take 1,000 mg by mouth, Disp: , Rfl:   •  SUMAtriptan (IMITREX) 100 mg tablet, Take by mouth, Disp: , Rfl:   •  Synjardy XR 12 5-1000 MG TB24, , Disp: , Rfl:   •  methylPREDNISolone 4 MG tablet therapy pack, Use as directed on package (Patient not taking: Reported on 12/20/2022), Disp: 1 each, Rfl: 0  •  tobramycin-dexamethasone (TOBRADEX) ophthalmic suspension, Administer 1 drop to both eyes every 4 (four) hours while awake for 7 days, Disp: 5 mL, Rfl: 0    Current Allergies     Allergies as of 12/20/2022 - Reviewed 12/20/2022   Allergen Reaction Noted   • Clarithromycin Nausea Only 09/12/2016            The following portions of the patient's history were reviewed and updated as appropriate: allergies, current medications, past family history, past medical history, past social history, past surgical history and problem list      Past Medical History:   Diagnosis Date   • Diabetes mellitus ([address] )        Past Surgical History:   Procedure Laterality Date   • UTERINE FIBROID SURGERY         Family History   Problem Relation Age of Onset   • Cancer Mother    • Heart disease Father          Medications have been verified          Objective   Pulse 86   Temp (!) 97 3 °F (36 3 °C)   Resp 18   Wt 88 5 kg (195 lb)   SpO2 98%   BMI 32 45 kg/m²   No LMP recorded  Physical Exam     Physical Exam  Constitutional:       Appearance: Normal appearance  HENT:      Head: Normocephalic and atraumatic  Nose: Congestion present  Mouth/Throat:      Mouth: Mucous membranes are dry  Eyes:      Extraocular Movements: Extraocular movements intact  Conjunctiva/sclera: Conjunctivae normal       Pupils: Pupils are equal, round, and reactive to light  Cardiovascular:      Rate and Rhythm: Normal rate  Pulmonary:      Effort: Pulmonary effort is normal    Musculoskeletal:         General: Normal range of motion  Cervical back: Normal range of motion and neck supple  Skin:     General: Skin is warm and dry  Capillary Refill: Capillary refill takes less than 2 seconds  Neurological:      General: No focal deficit present  Mental Status: She is alert and oriented to person, place, and time     Psychiatric:         Mood and Affect: Mood normal          Behavior: Behavior normal

## 2022-12-21 LAB
FLUAV RNA RESP QL NAA+PROBE: NEGATIVE
FLUBV RNA RESP QL NAA+PROBE: NEGATIVE
SARS-COV-2 RNA RESP QL NAA+PROBE: NEGATIVE

## 2023-02-02 ENCOUNTER — HOSPITAL ENCOUNTER (OUTPATIENT)
Dept: MAMMOGRAPHY | Facility: HOSPITAL | Age: 48
Discharge: HOME | End: 2023-02-02

## 2023-02-02 ENCOUNTER — HOSPITAL ENCOUNTER (OUTPATIENT)
Dept: ULTRASOUND IMAGING | Facility: HOSPITAL | Age: 48
Discharge: HOME | End: 2023-02-02

## 2023-02-02 VITALS — BODY MASS INDEX: 32.51 KG/M2 | HEIGHT: 65 IN | WEIGHT: 195.11 LBS

## 2023-02-02 DIAGNOSIS — R92.8 ABNORMAL MAMMOGRAM: ICD-10-CM

## 2023-02-03 DIAGNOSIS — R92.8 OTHER ABNORMAL AND INCONCLUSIVE FINDINGS ON DIAGNOSTIC IMAGING OF BREAST: ICD-10-CM

## 2023-08-03 ENCOUNTER — HOSPITAL ENCOUNTER (OUTPATIENT)
Dept: MAMMOGRAPHY | Facility: HOSPITAL | Age: 48
Discharge: HOME | End: 2023-08-03
Payer: COMMERCIAL

## 2023-08-03 ENCOUNTER — HOSPITAL ENCOUNTER (OUTPATIENT)
Dept: ULTRASOUND IMAGING | Facility: HOSPITAL | Age: 48
Discharge: HOME | End: 2023-08-03
Payer: COMMERCIAL

## 2023-08-03 DIAGNOSIS — R92.8 ABNORMAL MAMMOGRAM: ICD-10-CM

## 2023-08-03 PROCEDURE — 77065 DX MAMMO INCL CAD UNI: CPT

## 2023-08-03 PROCEDURE — G0279 TOMOSYNTHESIS, MAMMO: HCPCS

## 2023-08-03 PROCEDURE — 76642 ULTRASOUND BREAST LIMITED: CPT

## 2024-10-10 ENCOUNTER — OFFICE VISIT (OUTPATIENT)
Dept: URGENT CARE | Facility: MEDICAL CENTER | Age: 49
End: 2024-10-10
Payer: COMMERCIAL

## 2024-10-10 VITALS
WEIGHT: 203 LBS | BODY MASS INDEX: 33.78 KG/M2 | HEART RATE: 87 BPM | TEMPERATURE: 97.8 F | SYSTOLIC BLOOD PRESSURE: 128 MMHG | RESPIRATION RATE: 20 BRPM | OXYGEN SATURATION: 98 % | DIASTOLIC BLOOD PRESSURE: 92 MMHG

## 2024-10-10 DIAGNOSIS — R19.7 DIARRHEA, UNSPECIFIED TYPE: Primary | ICD-10-CM

## 2024-10-10 PROCEDURE — S9083 URGENT CARE CENTER GLOBAL: HCPCS | Performed by: PHYSICIAN ASSISTANT

## 2024-10-10 PROCEDURE — G0381 LEV 2 HOSP TYPE B ED VISIT: HCPCS | Performed by: PHYSICIAN ASSISTANT

## 2024-10-10 RX ORDER — EMPAGLIFLOZIN, METFORMIN HYDROCHLORIDE 5; 1000 MG/1; MG/1
TABLET, EXTENDED RELEASE ORAL
COMMUNITY

## 2024-10-10 RX ORDER — DICYCLOMINE HCL 20 MG
20 TABLET ORAL EVERY 6 HOURS
Qty: 20 TABLET | Refills: 0 | Status: SHIPPED | OUTPATIENT
Start: 2024-10-10

## 2024-10-10 RX ORDER — CALCIUM CARBONATE/VITAMIN D3 500 MG-10
2 TABLET,CHEWABLE ORAL
COMMUNITY

## 2024-10-10 RX ORDER — NORETHINDRONE ACETATE 5 MG
TABLET ORAL
COMMUNITY
Start: 2024-10-03

## 2024-10-10 NOTE — LETTER
October 10, 2024     Patient: Dali Moreau   YOB: 1975   Date of Visit: 10/10/2024       To Whom It May Concern:    It is my medical opinion that Dali Moreau may return to work on 10/11/24 .    If you have any questions or concerns, please don't hesitate to call.         Sincerely,        Veronica Sharif PA-C    CC: No Recipients

## 2024-10-10 NOTE — PROGRESS NOTES
[facility]        NAME: Dali Moreau is a 49 y.o. female  : 1975    MRN: [id number]  DATE: October 10, 2024  TIME: 8:55 AM    Assessment and Plan   Diarrhea, unspecified type [R19.7]  1. Diarrhea, unspecified type  dicyclomine (BENTYL) 20 mg tablet            Patient Instructions     Metamucil will help slow diarrhea  [place] diet and gradually increase diet as tolerated.  Refrain from dairy products until feeling better.  Fluids  Tylenol as needed for fever or pain.  If symptoms worsen have your self rechecked.  If symptoms fail to improve follow-up with PCP or GI.    Follow up with PCP in 3-5 days.  Proceed to  ER if symptoms worsen.    If tests have been performed at [facility], our office will contact you with results if changes need to be made to the care plan discussed with you at the visit.  You can review your full results on [facility] MyChart.    Chief Complaint     Chief Complaint   Patient presents with    Diarrhea     Has had Diarrhea since last Friday. Has been taking imodium and it has been helping but causing bad gas pain. Has stomach cramping with imodium. Went about 10 times yesterday. None today so far. No fevers or other SX. Has been eating toast broth and crackers         History of Present Illness       Patient started with diarrhea 6 days ago.  She has been taking Imodium which helps the diarrhea but then she has a lot of gas which causes terrible pain.  Having crampy abdominal pain followed by diarrhea.  Moving her bowels at least 10 times daily.  No diarrhea so far today.  Symptoms are not responsive to specific food groups.  She has been tolerating fluids eating crackers, toast and broth.  She had vomiting initially which resolved after 1 day.  Patient denies fevers.         Review of Systems   Review of Systems   Constitutional:  Negative for fever.   Gastrointestinal:  Positive for abdominal pain, diarrhea and vomiting (resolved). Negative for nausea.         Current  Medications       Current Outpatient Medications:     aspirin-acetaminophen-caffeine (EXCEDRIN MIGRAINE) 250-250-65 MG per tablet, Take 2 tablets by mouth, Disp: , Rfl:     Blood Glucose Monitoring Suppl (ACURA BLOOD GLUCOSE METER) w/Device KIT, by Does not apply route, Disp: , Rfl:     Calcium Carb-Cholecalciferol 500-10 MG-MCG CHEW, Chew 2 tablets, Disp: , Rfl:     Cyanocobalamin 1000 MCG CAPS, Take 1 tablet by mouth daily, Disp: , Rfl:     dicyclomine (BENTYL) 20 mg tablet, Take 1 tablet (20 mg total) by mouth every 6 (six) hours, Disp: 20 tablet, Rfl: 0    Empagliflozin-metFORMIN HCl ER (Synjardy XR) 5-1000 MG TB24, , Disp: , Rfl:     ergocalciferol (VITAMIN D2) 50,000 units, Take 50,000 Units by mouth, Disp: , Rfl:     famotidine (PEPCID) 20 mg tablet, , Disp: , Rfl:     glucose blood test strip, check bs daily, Disp: , Rfl:     glucose monitoring kit (FREESTYLE) monitoring kit, by Does not apply route, Disp: , Rfl:     ibuprofen (MOTRIN) 800 mg tablet, Take by mouth, Disp: , Rfl:     labetalol (NORMODYNE) 200 mg tablet, TAKE 1 TABLET TWICE A DAY, Disp: , Rfl:     Lancets Thin MISC, by Does not apply route, Disp: , Rfl:     norethindrone (AYGESTIN) 5 mg tablet, , Disp: , Rfl:     benzonatate (TESSALON) 200 MG capsule, Take 1 capsule (200 mg total) by mouth 3 (three) times a day as needed for cough, Disp: 20 capsule, Rfl: 0    bromocriptine (PARLODEL) 2.5 mg tablet, Take 2.5 mg by mouth, Disp: , Rfl:     cabergoline (DOSTINEX) 0.5 MG tablet, TAKE ONE-HALF (1/2) TABLET AT BEDTIME ONCE EVERY 10 DAYS, Disp: , Rfl:     fluticasone (FLONASE) 50 mcg/act nasal spray, 2 sprays into each nostril daily, Disp: 1 g, Rfl: 0    Icosapent Ethyl 1 g CAPS, Take 2 g by mouth, Disp: , Rfl:     insulin regular (HumuLIN R,NovoLIN R) 100 units/mL injection, Inject 14 units under the skin as directed per specialty, Disp: , Rfl:     loratadine-pseudoephedrine (CLARITIN-D 12-HOUR) 5-120 mg per tablet, Take 1 tablet by mouth 2 (two)  times a day, Disp: 50 tablet, Rfl: 0    metFORMIN (GLUMETZA) 1000 MG (MOD) 24 hr tablet, Take 1,000 mg by mouth, Disp: , Rfl:     methylPREDNISolone 4 MG tablet therapy pack, Use as directed on package (Patient not taking: Reported on 12/20/2022), Disp: 1 each, Rfl: 0    SUMAtriptan (IMITREX) 100 mg tablet, Take by mouth, Disp: , Rfl:     Synjardy XR 12.5-1000 MG TB24, , Disp: , Rfl:     tobramycin-dexamethasone (TOBRADEX) ophthalmic suspension, Administer 1 drop to both eyes every 4 (four) hours while awake for 7 days, Disp: 5 mL, Rfl: 0    Current Allergies     Allergies as of 10/10/2024 - Reviewed 10/10/2024   Allergen Reaction Noted    Clarithromycin Nausea Only 09/12/2016            The following portions of the patient's history were reviewed and updated as appropriate: allergies, current medications, past family history, past medical history, past social history, past surgical history and problem list.     Past Medical History:   Diagnosis Date    Diabetes mellitus (HCC)        Past Surgical History:   Procedure Laterality Date    UTERINE FIBROID SURGERY         Family History   Problem Relation Age of Onset    BRCA2 Negative Mother     BRCA1 Negative Mother     Breast cancer Mother 55    Heart disease Father     Lung cancer Paternal Grandfather     Breast cancer Maternal Aunt 42    Breast cancer Maternal Aunt 62    No Known Problems Paternal Aunt          Medications have been verified.        Objective   /92   Pulse 87   Temp 97.8 °F (36.6 °C)   Resp 20   Wt 92.1 kg (203 lb)   LMP 07/04/2018 (Exact Date)   SpO2 98%   BMI 33.78 kg/m²   Patient's last menstrual period was 07/04/2018 (exact date).       Physical Exam     Physical Exam  Vitals and nursing note reviewed.   Constitutional:       Appearance: Normal appearance.   HENT:      Head: Normocephalic and atraumatic.   Cardiovascular:      Rate and Rhythm: Normal rate.   Pulmonary:      Effort: Pulmonary effort is normal.   Abdominal:       General: Abdomen is flat.      Tenderness: There is no abdominal tenderness.   Skin:     General: Skin is warm.   Neurological:      Mental Status: She is alert.

## 2024-10-10 NOTE — PATIENT INSTRUCTIONS
Metamucil will help slow diarrhea  [place] diet and gradually increase diet as tolerated.  Refrain from dairy products until feeling better.  Fluids  Tylenol as needed for fever or pain.  If symptoms worsen have your self rechecked.  If symptoms fail to improve follow-up with PCP or GI.

## 2025-02-01 ENCOUNTER — OFFICE VISIT (OUTPATIENT)
Dept: URGENT CARE | Facility: MEDICAL CENTER | Age: 50
End: 2025-02-01
Payer: COMMERCIAL

## 2025-02-01 VITALS
BODY MASS INDEX: 32.3 KG/M2 | OXYGEN SATURATION: 96 % | HEART RATE: 96 BPM | SYSTOLIC BLOOD PRESSURE: 128 MMHG | HEIGHT: 66 IN | RESPIRATION RATE: 20 BRPM | TEMPERATURE: 98.7 F | DIASTOLIC BLOOD PRESSURE: 90 MMHG | WEIGHT: 201 LBS

## 2025-02-01 DIAGNOSIS — J06.9 URI, ACUTE: Primary | ICD-10-CM

## 2025-02-01 PROCEDURE — S9083 URGENT CARE CENTER GLOBAL: HCPCS | Performed by: PHYSICIAN ASSISTANT

## 2025-02-01 PROCEDURE — G0381 LEV 2 HOSP TYPE B ED VISIT: HCPCS | Performed by: PHYSICIAN ASSISTANT

## 2025-02-01 RX ORDER — BENZONATATE 200 MG/1
200 CAPSULE ORAL 3 TIMES DAILY PRN
Qty: 20 CAPSULE | Refills: 0 | Status: SHIPPED | OUTPATIENT
Start: 2025-02-01

## 2025-02-01 RX ORDER — ALBUTEROL SULFATE 90 UG/1
2 INHALANT RESPIRATORY (INHALATION) EVERY 4 HOURS PRN
Qty: 18 G | Refills: 0 | Status: SHIPPED | OUTPATIENT
Start: 2025-02-01 | End: 2025-03-03

## 2025-02-01 NOTE — PROGRESS NOTES
[facility]        NAME: Dali Moreau is a 49 y.o. female  : 1975    MRN: [id number]  DATE: 2025  TIME: 11:55 AM    Assessment and Plan   URI, acute [J06.9]  1. URI, acute  albuterol (PROVENTIL HFA,VENTOLIN HFA) 90 mcg/act inhaler    benzonatate (TESSALON) 200 MG capsule            Patient Instructions     Use Albuterol every 4 hrs as needed for cough  Take Tessalon for cough not improved by Albuterol  Tylenol or Ibuprofen as needed for fever or pain  Drink plenty of fluids  Over the Counter cold medication to control symptoms  If symptoms fail to improve follow up with PCP  If symptoms worsen have yourself rechecked;    Follow up with PCP in 3-5 days.  Proceed to  ER if symptoms worsen.    If tests have been performed at [facility], our office will contact you with results if changes need to be made to the care plan discussed with you at the visit.  [facility]    Chief Complaint     Chief Complaint   Patient presents with   • Cold Like Symptoms     Patient states daughter has been diagnosed with + influenza. Patient has had cough runny nose, chills body aches and fatigue x 1 week. Denies fever         History of Present Illness       Patient presents with a 1 week history of cold-like symptoms.  Her daughter was diagnosed with influenza last week.  Patient is currently having chills, fatigue, runny, stuffy nose, sore throat, dry cough, nausea, body aches and headaches.  Patient denies fever.  She did not have a flu vaccine.        Review of Systems   Review of Systems   Constitutional:  Positive for chills and fatigue. Negative for fever.   HENT:  Positive for congestion, rhinorrhea and sore throat.    Respiratory:  Positive for cough.    Gastrointestinal:  Positive for nausea. Negative for diarrhea and vomiting.   Musculoskeletal:  Positive for myalgias.   Neurological:  Positive for headaches.         Current Medications       Current  Outpatient Medications:   •  albuterol (PROVENTIL HFA,VENTOLIN HFA) 90 mcg/act inhaler, Inhale 2 puffs every 4 (four) hours as needed for wheezing or shortness of breath, Disp: 18 g, Rfl: 0  •  aspirin-acetaminophen-caffeine (EXCEDRIN MIGRAINE) 250-250-65 MG per tablet, Take 2 tablets by mouth, Disp: , Rfl:   •  benzonatate (TESSALON) 200 MG capsule, Take 1 capsule (200 mg total) by mouth 3 (three) times a day as needed for cough, Disp: 20 capsule, Rfl: 0  •  Blood Glucose Monitoring Suppl (ACURA BLOOD GLUCOSE METER) w/Device KIT, by Does not apply route, Disp: , Rfl:   •  Calcium Carb-Cholecalciferol 500-10 MG-MCG CHEW, Chew 2 tablets, Disp: , Rfl:   •  Cyanocobalamin 1000 MCG CAPS, Take 1 tablet by mouth daily, Disp: , Rfl:   •  dicyclomine (BENTYL) 20 mg tablet, Take 1 tablet (20 mg total) by mouth every 6 (six) hours, Disp: 20 tablet, Rfl: 0  •  Empagliflozin-metFORMIN HCl ER (Synjardy XR) 5-1000 MG TB24, , Disp: , Rfl:   •  ergocalciferol (VITAMIN D2) 50,000 units, Take 50,000 Units by mouth, Disp: , Rfl:   •  famotidine (PEPCID) 20 mg tablet, , Disp: , Rfl:   •  glucose blood test strip, check bs daily, Disp: , Rfl:   •  glucose monitoring kit (FREESTYLE) monitoring kit, by Does not apply route, Disp: , Rfl:   •  ibuprofen (MOTRIN) 800 mg tablet, Take by mouth, Disp: , Rfl:   •  labetalol (NORMODYNE) 200 mg tablet, TAKE 1 TABLET TWICE A DAY, Disp: , Rfl:   •  Lancets Thin MISC, by Does not apply route, Disp: , Rfl:   •  norethindrone (AYGESTIN) 5 mg tablet, , Disp: , Rfl:   •  Synjardy XR 12.5-1000 MG TB24, , Disp: , Rfl:   •  bromocriptine (PARLODEL) 2.5 mg tablet, Take 2.5 mg by mouth, Disp: , Rfl:   •  cabergoline (DOSTINEX) 0.5 MG tablet, TAKE ONE-HALF (1/2) TABLET AT BEDTIME ONCE EVERY 10 DAYS, Disp: , Rfl:   •  fluticasone (FLONASE) 50 mcg/act nasal spray, 2 sprays into each nostril daily, Disp: 1 g, Rfl: 0  •  Icosapent Ethyl 1 g CAPS, Take 2 g by mouth, Disp: , Rfl:   •  insulin regular (HumuLIN  "R,NovoLIN R) 100 units/mL injection, Inject 14 units under the skin as directed per specialty, Disp: , Rfl:   •  loratadine-pseudoephedrine (CLARITIN-D 12-HOUR) 5-120 mg per tablet, Take 1 tablet by mouth 2 (two) times a day, Disp: 50 tablet, Rfl: 0  •  metFORMIN (GLUMETZA) 1000 MG (MOD) 24 hr tablet, Take 1,000 mg by mouth, Disp: , Rfl:   •  methylPREDNISolone 4 MG tablet therapy pack, Use as directed on package (Patient not taking: Reported on 12/20/2022), Disp: 1 each, Rfl: 0  •  SUMAtriptan (IMITREX) 100 mg tablet, Take by mouth, Disp: , Rfl:   •  tobramycin-dexamethasone (TOBRADEX) ophthalmic suspension, Administer 1 drop to both eyes every 4 (four) hours while awake for 7 days, Disp: 5 mL, Rfl: 0    Current Allergies     Allergies as of 02/01/2025 - Reviewed 02/01/2025   Allergen Reaction Noted   • Clarithromycin Nausea Only 09/12/2016            The following portions of the patient's history were reviewed and updated as appropriate: allergies, current medications, past family history, past medical history, past social history, past surgical history and problem list.     Past Medical History:   Diagnosis Date   • Diabetes mellitus (HCC)        Past Surgical History:   Procedure Laterality Date   • UTERINE FIBROID SURGERY         Family History   Problem Relation Age of Onset   • BRCA2 Negative Mother    • BRCA1 Negative Mother    • Breast cancer Mother 55   • Heart disease Father    • Lung cancer Paternal Grandfather    • Breast cancer Maternal Aunt 42   • Breast cancer Maternal Aunt 62   • No Known Problems Paternal Aunt          Medications have been verified.        Objective   /90   Pulse 96   Temp 98.7 °F (37.1 °C)   Resp 20   Ht 5' 6\" (1.676 m)   Wt 91.2 kg (201 lb)   LMP 07/04/2018 (Exact Date)   SpO2 96%   BMI 32.44 kg/m²   Patient's last menstrual period was 07/04/2018 (exact date).       Physical Exam     Physical Exam  Vitals and nursing note reviewed.   Constitutional:       " Appearance: Normal appearance.   HENT:      Head: Normocephalic and atraumatic.      Right Ear: Tympanic membrane normal.      Left Ear: Tympanic membrane normal.      Mouth/Throat:      Mouth: Mucous membranes are moist.      Pharynx: Oropharynx is clear.   Eyes:      Conjunctiva/sclera: Conjunctivae normal.   Cardiovascular:      Rate and Rhythm: Normal rate and regular rhythm.      Heart sounds: Normal heart sounds.   Pulmonary:      Effort: Pulmonary effort is normal.      Breath sounds: Normal breath sounds.   Musculoskeletal:      Cervical back: Neck supple.   Lymphadenopathy:      Cervical: Cervical adenopathy present.   Skin:     General: Skin is warm.   Neurological:      Mental Status: She is alert.

## 2025-02-01 NOTE — PATIENT INSTRUCTIONS
Use Albuterol every 4 hrs as needed for cough  Take Tessalon for cough not improved by Albuterol  Tylenol or Ibuprofen as needed for fever or pain  Drink plenty of fluids  Over the Counter cold medication to control symptoms  If symptoms fail to improve follow up with PCP  If symptoms worsen have yourself rechecked;